# Patient Record
Sex: FEMALE | URBAN - METROPOLITAN AREA
[De-identification: names, ages, dates, MRNs, and addresses within clinical notes are randomized per-mention and may not be internally consistent; named-entity substitution may affect disease eponyms.]

---

## 2023-12-06 ENCOUNTER — TELEPHONE ENCOUNTER (OUTPATIENT)
Dept: URBAN - METROPOLITAN AREA CLINIC 64 | Facility: CLINIC | Age: 43
End: 2023-12-06

## 2023-12-22 ENCOUNTER — DASHBOARD ENCOUNTERS (OUTPATIENT)
Age: 43
End: 2023-12-22

## 2023-12-22 ENCOUNTER — OFFICE VISIT (OUTPATIENT)
Dept: URBAN - METROPOLITAN AREA CLINIC 63 | Facility: CLINIC | Age: 43
End: 2023-12-22
Payer: COMMERCIAL

## 2023-12-22 VITALS
OXYGEN SATURATION: 99 % | BODY MASS INDEX: 32.26 KG/M2 | RESPIRATION RATE: 20 BRPM | TEMPERATURE: 97.6 F | WEIGHT: 193.6 LBS | DIASTOLIC BLOOD PRESSURE: 60 MMHG | SYSTOLIC BLOOD PRESSURE: 115 MMHG | HEART RATE: 96 BPM | HEIGHT: 65 IN

## 2023-12-22 DIAGNOSIS — K21.9 CHRONIC GERD: ICD-10-CM

## 2023-12-22 PROBLEM — 235595009: Status: ACTIVE | Noted: 2023-12-22

## 2023-12-22 PROCEDURE — 99202 OFFICE O/P NEW SF 15 MIN: CPT

## 2023-12-22 RX ORDER — NORETHINDRONE ACETATE AND ETHINYL ESTRADIOL, ETHINYL ESTRADIOL AND FERROUS FUMARATE 1MG-10(24)
TAKE 1 TABLET BY MOUTH EVERY DAY KIT ORAL
Qty: 84 EACH | Refills: 2 | Status: ACTIVE | COMMUNITY

## 2023-12-22 RX ORDER — OMEPRAZOLE 20 MG/1
1 CAPSULE 30 MINUTES BEFORE MORNING MEAL CAPSULE, DELAYED RELEASE ORAL ONCE A DAY
Status: ACTIVE | COMMUNITY

## 2023-12-22 NOTE — HPI-ZZZTODAY'S VISIT
Janie is a very pleasant 43-year-old female who presents for heartburn.  She is a new patient to our practice and will be establishing with Dr. Parr.  Past medical history significant for GERD. Past surgical history significant for nasal procedure. She denies previous Colonoscopy and endoscopy. Family history is significant for paternal esophageal and stomach precancerous polyps.  Janie presents today with a chief complaint of heartburn.  She reports a burning sensation in her throat and regurgitation of acid and food when she lays down after eating.  She has taken omeprazole 20 mg for 5 years with excellent efficacy.  She states as long as she does not miss a dose she does not have the symptoms.  She is concerned however because of her paternal history of esophageal and stomach polyps.  She has 1 easy to pass bowel movement per day.  She denies dysphagia, dyspepsia, abdominal pain, unintentional weight loss, change in bowel habits, melena, or hematochezia.

## 2023-12-29 ENCOUNTER — LAB OUTSIDE AN ENCOUNTER (OUTPATIENT)
Dept: URBAN - METROPOLITAN AREA CLINIC 63 | Facility: CLINIC | Age: 43
End: 2023-12-29

## 2024-02-06 ENCOUNTER — LAB (OUTPATIENT)
Dept: URBAN - METROPOLITAN AREA CLINIC 4 | Facility: CLINIC | Age: 44
End: 2024-02-06
Payer: COMMERCIAL

## 2024-02-06 ENCOUNTER — EGD (OUTPATIENT)
Dept: URBAN - METROPOLITAN AREA SURGERY CENTER 4 | Facility: SURGERY CENTER | Age: 44
End: 2024-02-06
Payer: COMMERCIAL

## 2024-02-06 DIAGNOSIS — K29.70 GASTRITIS, UNSPECIFIED, WITHOUT BLEEDING: ICD-10-CM

## 2024-02-06 DIAGNOSIS — K29.50 CHRONIC GASTRITIS WITHOUT BLEEDING, UNSPECIFIED GASTRITIS TYPE: ICD-10-CM

## 2024-02-06 DIAGNOSIS — K44.9 DIAPHRAGMATIC HERNIA WITHOUT OBSTRUCTION OR GANGRENE: ICD-10-CM

## 2024-02-06 DIAGNOSIS — K22.2 ESOPHAGEAL OBSTRUCTION: ICD-10-CM

## 2024-02-06 PROCEDURE — 88312 SPECIAL STAINS GROUP 1: CPT | Performed by: PATHOLOGY

## 2024-02-06 PROCEDURE — 43239 EGD BIOPSY SINGLE/MULTIPLE: CPT | Performed by: INTERNAL MEDICINE

## 2024-02-06 PROCEDURE — 88305 TISSUE EXAM BY PATHOLOGIST: CPT | Performed by: PATHOLOGY

## 2024-02-06 RX ORDER — NORETHINDRONE ACETATE AND ETHINYL ESTRADIOL, ETHINYL ESTRADIOL AND FERROUS FUMARATE 1MG-10(24)
TAKE 1 TABLET BY MOUTH EVERY DAY KIT ORAL
Qty: 84 EACH | Refills: 2 | Status: ACTIVE | COMMUNITY

## 2024-02-06 RX ORDER — OMEPRAZOLE 20 MG/1
1 CAPSULE 30 MINUTES BEFORE MORNING MEAL CAPSULE, DELAYED RELEASE ORAL ONCE A DAY
Status: ACTIVE | COMMUNITY